# Patient Record
Sex: FEMALE | Race: WHITE | NOT HISPANIC OR LATINO | Employment: OTHER | URBAN - METROPOLITAN AREA
[De-identification: names, ages, dates, MRNs, and addresses within clinical notes are randomized per-mention and may not be internally consistent; named-entity substitution may affect disease eponyms.]

---

## 2021-03-24 ENCOUNTER — TRANSFERRED RECORDS (OUTPATIENT)
Dept: HEALTH INFORMATION MANAGEMENT | Facility: CLINIC | Age: 61
End: 2021-03-24
Payer: COMMERCIAL

## 2021-08-25 ENCOUNTER — LAB REQUISITION (OUTPATIENT)
Dept: LAB | Facility: CLINIC | Age: 61
End: 2021-08-25

## 2021-08-25 PROCEDURE — 86481 TB AG RESPONSE T-CELL SUSP: CPT | Performed by: INTERNAL MEDICINE

## 2021-08-26 LAB
GAMMA INTERFERON BACKGROUND BLD IA-ACNC: 0.03 IU/ML
M TB IFN-G BLD-IMP: NEGATIVE
M TB IFN-G CD4+ BCKGRND COR BLD-ACNC: 9.97 IU/ML
MITOGEN IGNF BCKGRD COR BLD-ACNC: 0.05 IU/ML
MITOGEN IGNF BCKGRD COR BLD-ACNC: 0.09 IU/ML
QUANTIFERON MITOGEN: 10 IU/ML
QUANTIFERON NIL TUBE: 0.03 IU/ML
QUANTIFERON TB1 TUBE: 0.08 IU/ML
QUANTIFERON TB2 TUBE: 0.12

## 2021-11-03 ENCOUNTER — TRANSCRIBE ORDERS (OUTPATIENT)
Dept: OTHER | Age: 61
End: 2021-11-03

## 2021-11-03 DIAGNOSIS — Z85.3 HX: BREAST CANCER: Primary | ICD-10-CM

## 2021-11-04 ENCOUNTER — PATIENT OUTREACH (OUTPATIENT)
Dept: ONCOLOGY | Facility: CLINIC | Age: 61
End: 2021-11-04

## 2021-11-05 NOTE — PROGRESS NOTES
New Patient Oncology Nurse Navigator Note     Referring provider: Self     Referring Clinic/Organization: patient is following Dr. De Souza from [facility]     Referred to (specialty): Medical Oncology     Requested provider (if applicable): Dr. Myles De Souza     Date Referral Received: November 4, 2021     Evaluation for:  Breast cancer     Clinical History (per Nurse review of records provided):      The patient presented with a diagnosis of atypical lobular hyperplasia of the left breast.  This was identified as architectural distortion and spiculated mass on screening mammogram.  She underwent left breast wire localized lumpectomy on 10/18/2017.     On final pathology, after undergoing the wire localized lumpectomy, she was found to have an invasive ductal carcinoma, grade 1, measuring 8 mm.  It was completely excised, with clear margins.   She opted to proceed with breast-conserving surgery.  She did have genetic testing and was negative. [place] lymph node biopsy was completed on 11/15/2017 with path finding negative sentinel lymph nodes.  Completed radiotherapy on 1/9/2018.    Initiated Tamoxifen April 2018.    Records Location (Care Everywhere, Media, etc.): Care Everywhere, Media, Bookmarked material    Records Needed: [facility], [facility]    She will be having a mammogram on 11/19 at [facility] in [place].      Telephoned and spoke briefly with Felipa who is traveling and requested call back in 10 minutes.  Writer did return call and left voice message requesting call back at her convenience.    We did speak again on 11/8 at 16:15 and she has not completed MYRNA with [facility]. She requested our medical records team facilitate that process.

## 2021-11-10 ENCOUNTER — PRE VISIT (OUTPATIENT)
Dept: OTHER | Age: 61
End: 2021-11-10

## 2021-11-10 NOTE — TELEPHONE ENCOUNTER
Action 11/10/2021@ 0933am   Action Taken Emailed MYRNA TO PATIENT FOR [facility]  CK        Burow's Graft Text: The defect edges were debeveled with a #15 scalpel blade.  Given the location of the defect, shape of the defect, the proximity to free margins and the presence of a standing cone deformity a Burow's skin graft was deemed most appropriate. The standing cone was removed and this tissue was then trimmed to the shape of the primary defect. The adipose tissue was also removed until only dermis and epidermis were left.  The skin margins of the secondary defect were undermined to an appropriate distance in all directions utilizing iris scissors.  The secondary defect was closed with interrupted buried subcutaneous sutures.  The skin edges were then re-apposed with running  sutures.  The skin graft was then placed in the primary defect and oriented appropriately.

## 2021-11-18 NOTE — TELEPHONE ENCOUNTER
RECORDS STATUS - BREAST    RECORDS REQUESTED FROM: Rosa Brock   DATE REQUESTED: 11/19/21   NOTES DETAILS STATUS   OFFICE NOTE from referring provider SELF    OFFICE NOTE from medical oncologist External- [facility] Dr. Myles De Souza   OFFICE NOTE from surgeon     OFFICE NOTE from radiation oncologist [facility] 01/09/18: Dr Carmen Choudhury   DISCHARGE SUMMARY from hospital     DISCHARGE REPORT from the ER     OPERATIVE REPORT [facility] 11/15/17: LEFT AXILLARY NODE SENTINEL LYMPH NODE BIOPSY  10/18/17: WIRE LOCALIZED LEFT BREAST LUMPECTOMY   MEDICATION LIST [facility]    CLINICAL TRIAL TREATMENTS TO DATE     LABS     REQUEST BLOCKS FOR ALL BREAST CANCER PTS     PATHOLOGY REPORTS  (Tissue diagnosis, Stage, ER/NV percentage positive and intensity of staining, HER2 IHC, FISH, and all biopsies from breast and any distant metastasis)                 Records in [facility] (not requested per protocol) 11/15/17: [id number]  10/18/17: [id number]  09/11/17: [id number]   GENONOMIC TESTING     TYPE:   (Next Generation Sequencing, including Foundation One testing, and Oncotype score)     IMAGING (NEED IMAGES & REPORT)     CT SCANS     XRAYS PACS 10/18/17   MRI PACS 10/27/17-09/20/13   MAMMO PACS 11/17/20-06/15/09   ULTRASOUND PACS 10/18/17-09/11/17   PET     BONE SCAN     BRAIN MRI       Action November 19, 2021 8:47 AM ABT   Action Taken Images from [facility] received and resolved to PACS. Called and spoke to Tiffanie at [facility], per [place] request a fax was sent to them at [phone] to resolve mammos, request sent    3:12 PM  Mammos resolved to PACS

## 2021-11-18 NOTE — TELEPHONE ENCOUNTER
Action 11/18/2021@1021am   Action Taken Records received from [facility] sent to urgent scanning  Ck

## 2021-12-01 NOTE — PROGRESS NOTES
[facility] Medical Oncology Note  December 2, 2021         Outpatient Progress Note      Assessment:     1. Stage IA ([id number]) well differentiated luminal a invasive ductal breast cancer.  This is a subcentimeter tumor with a risk of distant relapse at 10 years of really only 12% if we look at the [place] database of similar patients treated without systemic therapy.  2. Status post adjuvant radiotherapy.  3. If her chance of cure is in the high 80% range even without adjuvant systemic therapy, clearly the benefit of tamoxifen or an aromatase inhibitor is in the single digits.  It should really only be undertaken by somebody who was tolerates the side effects.  We have tried a lot with this very nice person and she had a good 3 years of adjuvant endocrine treatment.  I really do not feel strongly that she needs to remain on it for a total of 5 years.  4. Now, 4 years out from the time of diagnosis with no evidence of recurrent disease by history or physical exam.  5. 2 benign skin lesions on the chest wall.        Plan:     1. Return to clinic with me in 6 months for next exam  2. Once we get 5 years out from the time of diagnosis, we will go to as needed visits.          Myles De Souza MD, MSc  Associate Professor of Medicine  [employer] [place] [employer]  [facility]  [address] [place] [address]  [phone]    __________________________________________________________________    Diagnosis   1. Stage IA 8 mm well differentiated invasive ductal breast cancer, diagnosed definitively at lumpectomy 10/18/2017.  The tumor was 98% strongly positive for the estrogen receptor, 97% strongly positive for the progesterone receptor and negative for HER-2 by IHC with 0+ staining.  3 sentinel lymph nodes were negative for involvement.  Of note, there was LCIS in the specimen.        History of Present Illness/Therapy to Date:   1. Lumpectomy 10/18/2017  2. Adjuvant  radiotherapy completed 1/19/2018  3. Tamoxifen initiated 3/28/2018.  The dose was decreased to 10 mg January 2020.  It was held in January 2021 and finally just completely discontinued 3/24/2021.  She was not interested in an aromatase inhibitor.        Interval history:     Ashley is back    Her father's dementia is progressing.  She and her  are going to go down to [place] and spend time with her parents.  They even bought a place in [place] [employer].    She has a couple of skin lesions the chest wall that she wants me to look at.    She is otherwise feeling well.  She denies fevers, cough, new lumps or bumps.  There is no chest pain or shortness of breath.      Past Medical History:   I have reviewed this patient's past medical history   No past medical history on file.       Past Surgical History:    I have reviewed this patient's past surgical history       Social History:   Tobacco, ETOH, and rec drugs reviewed and as noted below with the following exceptions:  Ashley is  to Sy.  She is originally from [place] and went to [employer] and graduated in the class of 1976.  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  She then got her GENESIS at fairly [employer].  She was  and  and then met Sy who convinced her to move out to [place].  They have been together for the past 26 years and her one son together who finished college and was hoping to work in [employer] in [place].  She has 2 stepchildren both of whom live in the [facility].          Family History:     No family history on file.         Medications:     No current outpatient medications on file.              Physical Exam:   There were no vitals taken for this visit.    ECOG PS: 0  Constitutional: WDWN female in NAD, pleasant and appropriate  HEENT:  NC/AT, no icterus, OP clear,  MMM  Skin: No jaundice nor ecchymoses  Lungs: CTAB, no w/r/r, nonlabored breathing  Cardiovascular: RRR, S1, S2, no m/r/g  Abdomen: +BS, soft, nontender, nondistended, no organomegaly nor masses  MSK/Extremities: Warm, well perfused. No edema  LN: no cervical, supraclavicular, axillary, nor inguinal lymphadenopathy  Neurologic: alert, answering questions appropriately, moving all extremities spontaneously. CN 2-12 grossly intact.  Psych: appropriate affect  Breast exam: The left breast is status post lumpectomy and radiotherapy.  There is a well-healed upper outer quadrant incision.  There is no sign of local relapse.  There is minimal hyperpigmentation or skin thickening.  The right breast is without lesion and the right axilla is negative.  Data:   No results for input(s): WBC, NEUTROPHIL, HGB, PLT in the last 12533 hours.  No results for input(s): NA, POTASSIUM, CHLORIDE, CO2, ANIONGAP, BUN, CR, MANDY in the last 01466 hours.  No results for input(s): MAG, PHOS, LDH, URIC in the last 25693 hours.  No results for input(s): BILITOTAL, ALKPHOS, ALT, AST, ALBUMIN, LDH in the last 38457 hours.    No results found for this or any previous visit (from the past 24 hour(s)).    Other data           Labs, imaging and treatment plan reviewed with patient. All questions answered.        40 minutes spent on the date of the encounter doing chart review, review of outside records, review of test results, interpretation of tests, patient visit and documentation

## 2021-12-02 ENCOUNTER — ONCOLOGY VISIT (OUTPATIENT)
Dept: ONCOLOGY | Facility: CLINIC | Age: 61
End: 2021-12-02
Attending: INTERNAL MEDICINE
Payer: COMMERCIAL

## 2021-12-02 ENCOUNTER — PRE VISIT (OUTPATIENT)
Dept: ONCOLOGY | Facility: CLINIC | Age: 61
End: 2021-12-02
Payer: COMMERCIAL

## 2021-12-02 VITALS
DIASTOLIC BLOOD PRESSURE: 80 MMHG | HEART RATE: 59 BPM | WEIGHT: 168.2 LBS | OXYGEN SATURATION: 100 % | SYSTOLIC BLOOD PRESSURE: 121 MMHG

## 2021-12-02 DIAGNOSIS — C50.012 MALIGNANT NEOPLASM OF NIPPLE OF LEFT BREAST IN FEMALE, UNSPECIFIED ESTROGEN RECEPTOR STATUS (H): Primary | ICD-10-CM

## 2021-12-02 PROCEDURE — G0463 HOSPITAL OUTPT CLINIC VISIT: HCPCS

## 2021-12-02 PROCEDURE — 99215 OFFICE O/P EST HI 40 MIN: CPT | Performed by: INTERNAL MEDICINE

## 2021-12-02 ASSESSMENT — PAIN SCALES - GENERAL: PAINLEVEL: MILD PAIN (2)

## 2021-12-02 NOTE — LETTER
Date:December 29, 2021      Patient was self referred, no letter generated. Do not send.        [facility] Health Information

## 2021-12-02 NOTE — NURSING NOTE
Oncology Rooming Note    December 2, 2021 3:13 PM   Felipa Myers is a 61 year old female who presents for:    Chief Complaint   Patient presents with     Oncology Clinic Visit     breast cancer     Initial Vitals: /80   Pulse 59   Wt 76.3 kg (168 lb 3.2 oz)   SpO2 100%  There is no height or weight on file to calculate BMI. There is no height or weight on file to calculate BSA.  Mild Pain (2) Comment: Data Unavailable   No LMP recorded. Patient is postmenopausal.  Allergies reviewed: Yes  Medications reviewed: Yes    Medications: Medication refills not needed today.  Pharmacy name entered into [facility] [employer]: "[id number] - [place], [place] - [address] AT [facility]    Clinical concerns: none       Sepideh Hager CMA

## 2022-06-08 NOTE — PROGRESS NOTES
[facility] Medical Oncology Note  Raeann 10, 2022         Outpatient Progress Note      Assessment:     1. Stage IA ([id number]) well differentiated luminal a invasive ductal breast cancer.  This is a subcentimeter tumor with a risk of distant relapse at 10 years of really only 12% if we look at the [place] database of similar patients treated without systemic therapy.  2. Status post adjuvant radiotherapy.  3. If her chance of cure is in the high 80% range even without adjuvant systemic therapy, clearly the benefit of tamoxifen or an aromatase inhibitor is in the single digits.  It should really only be undertaken by somebody who was tolerates the side effects.  We have tried a lot with this very nice person and she had a good 3 years of adjuvant endocrine treatment.  I really do not feel strongly that she needs to remain on it for a total of 5 years.  4. Now, approaching 5 years out from the time of diagnosis with no evidence of recurrent disease by history or physical exam.  5. The truth is at this point we can start going to as needed visits.  She is not on any adjuvant systemic therapy.  She has an enormously high chance of cure at this point.  Ashley was delighted to hear this.      Plan:     1. Continue yearly bilateral mammography  2. Return to clinic with me on an as-needed basis       what a delight it has been to take care of this terrific person!        Myles De Souza MD, MSc  Associate Professor of Medicine  [employer] [place] [employer]  [facility]  [address] [place] [id number]  [phone]    __________________________________________________________________    Diagnosis   1. Stage IA 8 mm well differentiated invasive ductal breast cancer, diagnosed definitively at lumpectomy 10/18/2017.  The tumor was 98% strongly positive for the estrogen receptor, 97% strongly positive for the progesterone receptor and negative for HER-2 by IHC with 0+ staining.  3  sentinel lymph nodes were negative for involvement.  Of note, there was LCIS in the specimen.    History of Present Illness/Therapy to Date:   1. Lumpectomy 10/18/2017  2. Adjuvant radiotherapy completed 1/19/2018  3. Tamoxifen initiated 3/28/2018.  The dose was decreased to 10 mg January 2020.  It was held in January 2021 and finally just completely discontinued 3/24/2021.  She was not interested in an aromatase inhibitor.        Interval history:     Ashley is back    She recently suffered a tibia plateau fracture while in [place] this winter to help her parents, who are not ambulatory.  She ended up in a wheelchair.  Her  is in a knee brace because he is awaiting a knee replacement.  And to top it off, heir dog needed a cone of shame because he had pink eye. They were quite a combo.    She has recovered well and fells fine.    She has no new lumps or bumps    She is hoping this can be her last appointment.    Her father's dementia is progressing.      She has no other new concerns today.      Past Medical History:   I have reviewed this patient's past medical history   No past medical history on file.       Past Surgical History:    I have reviewed this patient's past surgical history       Social History:   Tobacco, ETOH, and rec drugs reviewed and as noted below with the following exceptions:  Ashley is  to Sy.  She is originally from [place] and went to [employer] high [employer] and graduated in the class of 1976.  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  She then got her GENESIS at [employer].  She was  and  and then met Sy who convinced her to move out to [place].  They have been together for the past 26 years and her one son together who finished college and was hoping to work in advertising in [place].  She has 2  stepchildren both of whom live in the [facility].          Family History:     No family history on file.         Medications:     No current outpatient medications on file.              Physical Exam:   There were no vitals taken for this visit.    ECOG PS: 0  Constitutional: WDWN female in NAD, pleasant and appropriate  HEENT:  NC/AT, no icterus, OP clear, MMM  Skin: No jaundice nor ecchymoses  Lungs: CTAB, no w/r/r, nonlabored breathing  Cardiovascular: RRR, S1, S2, no m/r/g  Abdomen: +BS, soft, nontender, nondistended, no organomegaly nor masses  MSK/Extremities: Warm, well perfused. No edema  LN: no cervical, supraclavicular, axillary, nor inguinal lymphadenopathy  Neurologic: alert, answering questions appropriately, moving all extremities spontaneously. CN 2-12 grossly intact.  Psych: appropriate affect  Breast exam: The left breast is status post lumpectomy and radiotherapy.  There is a well-healed upper outer quadrant incision.  There is no sign of local relapse.  There is minimal hyperpigmentation or skin thickening.  The right breast is without lesion and the right axilla is negative.  Data:   No results for input(s): WBC, NEUTROPHIL, HGB, PLT in the last 13348 hours.  No results for input(s): NA, POTASSIUM, CHLORIDE, CO2, ANIONGAP, BUN, CR, MANDY in the last 55735 hours.  No results for input(s): MAG, PHOS, LDH, URIC in the last 43406 hours.  No results for input(s): BILITOTAL, ALKPHOS, ALT, AST, ALBUMIN, LDH in the last 01959 hours.    No results found for this or any previous visit (from the past 24 hour(s)).    Other data           Labs, imaging and treatment plan reviewed with patient. All questions answered.        30 minutes spent on the date of the encounter doing chart review, review of outside records, review of test results, interpretation of tests, patient visit and documentation

## 2022-06-10 ENCOUNTER — ONCOLOGY VISIT (OUTPATIENT)
Dept: ONCOLOGY | Facility: CLINIC | Age: 62
End: 2022-06-10
Attending: INTERNAL MEDICINE
Payer: COMMERCIAL

## 2022-06-10 VITALS
RESPIRATION RATE: 18 BRPM | WEIGHT: 170.3 LBS | SYSTOLIC BLOOD PRESSURE: 135 MMHG | HEART RATE: 60 BPM | TEMPERATURE: 98 F | OXYGEN SATURATION: 100 % | DIASTOLIC BLOOD PRESSURE: 83 MMHG

## 2022-06-10 DIAGNOSIS — C50.012 MALIGNANT NEOPLASM OF NIPPLE OF LEFT BREAST IN FEMALE, UNSPECIFIED ESTROGEN RECEPTOR STATUS (H): Primary | ICD-10-CM

## 2022-06-10 PROCEDURE — G0463 HOSPITAL OUTPT CLINIC VISIT: HCPCS

## 2022-06-10 PROCEDURE — 99214 OFFICE O/P EST MOD 30 MIN: CPT | Performed by: INTERNAL MEDICINE

## 2022-06-10 RX ORDER — ALPRAZOLAM 0.25 MG
TABLET ORAL
COMMUNITY
Start: 2022-06-07

## 2022-06-10 RX ORDER — TRAMADOL HYDROCHLORIDE 50 MG/1
TABLET ORAL
COMMUNITY
Start: 2022-04-26

## 2022-06-10 ASSESSMENT — PAIN SCALES - GENERAL: PAINLEVEL: NO PAIN (0)

## 2022-06-10 NOTE — NURSING NOTE
Oncology Rooming Note    Raeann 10, 2022 11:05 AM   Felipa Myers is a 62 year old female who presents for:    Chief Complaint   Patient presents with     Oncology Clinic Visit     Breast cancer       Initial Vitals: /83 (BP Location: Right arm, Patient Position: Sitting, Cuff Size: Adult Regular)   Pulse 60   Temp 98  F (36.7  C) (Oral)   Resp 18   Wt 77.2 kg (170 lb 4.8 oz)   SpO2 100%  There is no height or weight on file to calculate BMI. There is no height or weight on file to calculate BSA.  No Pain (0) Comment: Data Unavailable   No LMP recorded. Patient is postmenopausal.  Allergies reviewed: Yes  Medications reviewed: Yes    Medications: Medication refills not needed today.  Pharmacy name entered into [facility]: [id number] - [place] [address] AT [facility]    Clinical concerns: No major changes reported. Patient states that she broke her left leg late last year-some left knee discomfort intermittently.        Helen Pittman LPN Raeann 10, 2022 11:05 AM

## 2022-06-10 NOTE — LETTER
6/10/2022         RE: Felip[facility] Myers  [address] [facility] [address]        Dear Colleague,    Thank you for referring your patient, Felipa Myers, to the [facility]. Please see a copy of my visit note below.      [facility] Medical Oncology Note  Raeann 10, 2022         Outpatient Progress Note      Assessment:     1. Stage IA ([id number]) well differentiated luminal a invasive ductal breast cancer.  This is a subcentimeter tumor with a risk of distant relapse at 10 years of really only 12% if we look at the [place] database of similar patients treated without systemic therapy.  2. Status post adjuvant radiotherapy.  3. If her chance of cure is in the high 80% range even without adjuvant systemic therapy, clearly the benefit of tamoxifen or an aromatase inhibitor is in the single digits.  It should really only be undertaken by somebody who was tolerates the side effects.  We have tried a lot with this very nice person and she had a good 3 years of adjuvant endocrine treatment.  I really do not feel strongly that she needs to remain on it for a total of 5 years.  4. Now, approaching 5 years out from the time of diagnosis with no evidence of recurrent disease by history or physical exam.  5. The truth is at this point we can start going to as needed visits.  She is not on any adjuvant systemic therapy.  She has an enormously high chance of cure at this point.  Ashley was delighted to hear this.      Plan:     1. Continue yearly bilateral mammography  2. Return to clinic with me on an as-needed basis       what a delight it has been to take care of this terrific person!        Myles De Souza MD, MSc  Associate Professor of Medicine  [employer]  [facility]  [address] [place] [id number]  [phone]    __________________________________________________________________    Diagnosis   1. Stage IA 8 mm  well differentiated invasive ductal breast cancer, diagnosed definitively at lumpectomy 10/18/2017.  The tumor was 98% strongly positive for the estrogen receptor, 97% strongly positive for the progesterone receptor and negative for HER-2 by IHC with 0+ staining.  3 sentinel lymph nodes were negative for involvement.  Of note, there was LCIS in the specimen.    History of Present Illness/Therapy to Date:   1. Lumpectomy 10/18/2017  2. Adjuvant radiotherapy completed 1/19/2018  3. Tamoxifen initiated 3/28/2018.  The dose was decreased to 10 mg January 2020.  It was held in January 2021 and finally just completely discontinued 3/24/2021.  She was not interested in an aromatase inhibitor.        Interval history:     Ashley is back    She recently suffered a tibia plateau fracture while in [place] this winter to help her parents, who are not ambulatory.  She ended up in a wheelchair.  Her  is in a knee brace because he is awaiting a knee replacement.  And to top it off, heparker dog needed a cone of shame because he had pink eye. They were quite a combo.    She has recovered well and fells fine.    She has no new lumps or bumps    She is hoping this can be her last appointment.    Her father's dementia is progressing.      She has no other new concerns today.      Past Medical History:   I have reviewed this patient's past medical history   No past medical history on file.       Past Surgical History:    I have reviewed this patient's past surgical history       Social History:   Tobacco, ETOH, and rec drugs reviewed and as noted below with the following exceptions:  Ashley is  to Sy.  She is originally from [place] and went to [employer] and graduated in the class of 1976.  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  She then got her [employer] at [employer].  She was  and  and then met Sy who convinced her to move out to [place].  They have been together for the past 26 years and her one son together who finished college and was hoping to work in [employer] in [place].  She has 2 stepchildren both of whom live in the [facility].          Family History:     No family history on file.         Medications:     No current outpatient medications on file.              Physical Exam:   There were no vitals taken for this visit.    ECOG PS: 0  Constitutional: WDWN female in NAD, pleasant and appropriate  HEENT:  NC/AT, no icterus, OP clear, MMM  Skin: No jaundice nor ecchymoses  Lungs: CTAB, no w/r/r, nonlabored breathing  Cardiovascular: RRR, S1, S2, no m/r/g  Abdomen: +BS, soft, nontender, nondistended, no organomegaly nor masses  MSK/Extremities: Warm, well perfused. No edema  LN: no cervical, supraclavicular, axillary, nor inguinal lymphadenopathy  Neurologic: alert, answering questions appropriately, moving all extremities spontaneously. CN 2-12 grossly intact.  Psych: appropriate affect  Breast exam: The left breast is status post lumpectomy and radiotherapy.  There is a well-healed upper outer quadrant incision.  There is no sign of local relapse.  There is minimal hyperpigmentation or skin thickening.  The right breast is without lesion and the right axilla is negative.  Data:   No results for input(s): WBC, NEUTROPHIL, HGB, PLT in the last 45426 hours.  No results for input(s): NA, POTASSIUM, CHLORIDE, CO2, ANIONGAP, BUN, CR, MANDY in the last 82412 hours.  No results for input(s): MAG, PHOS, LDH, URIC in the last 73868 hours.  No results for input(s): BILITOTAL, ALKPHOS, ALT, AST, ALBUMIN, LDH in the last 39614 hours.    No results found for this or any previous visit (from the past 24 hour(s)).    Other data       Labs, imaging and treatment plan reviewed with patient. All questions answered.      30 minutes spent on the date of the  encounter doing chart review, review of outside records, review of test results, interpretation of tests, patient visit and documentation           Again, thank you for allowing me to participate in the care of your patient.      Sincerely,    Myles De Souza MD

## 2022-07-24 ENCOUNTER — HEALTH MAINTENANCE LETTER (OUTPATIENT)
Age: 62
End: 2022-07-24

## 2022-08-01 ENCOUNTER — TELEPHONE (OUTPATIENT)
Dept: ONCOLOGY | Facility: CLINIC | Age: 62
End: 2022-08-01

## 2022-08-01 NOTE — TELEPHONE ENCOUNTER
Oncology Nurse Triage - Pain    Situation: Felipa (pt) reporting the following symptoms: Pain    Background:   Treating Provider:   Dr. De Souza     Date of last office visit: 6/10/22.     Assessment:     Location: Left under arm  Onset: pt states was carrying heavy objects about 4-5 days ago and pulled up on some of the objects and since then feels increase tenderness under arm.   Duration/Frequency:intermittent  Rates: 1/10  Quality: If palpated, some mild tenderness/like bruising/dull pain.   Current med(s) used:Pt tried Tylenol which did seem to help.   Worsens or relieves with: has some friction when arm moving with walking.   Skin in tact no new lumps or bumps.     Denies fevers/chills, cough, sore throat, chest pain, SOB, headache.      Recommendations:   This writer educated on avoid heavy lifting for next couple of weeks, try ice/heat therapy for 20minutes on an off. Gentle range of motion/stretching to arm.  Continue to self monitor. Follow up with PCP or UC if wants to be evaluated today or tomorrow.    Will send update to care team if they have any other concerns.

## 2022-10-03 ENCOUNTER — HEALTH MAINTENANCE LETTER (OUTPATIENT)
Age: 62
End: 2022-10-03

## 2023-08-13 ENCOUNTER — HEALTH MAINTENANCE LETTER (OUTPATIENT)
Age: 63
End: 2023-08-13

## 2024-03-09 ENCOUNTER — HEALTH MAINTENANCE LETTER (OUTPATIENT)
Age: 64
End: 2024-03-09

## 2024-08-12 ENCOUNTER — PATIENT OUTREACH (OUTPATIENT)
Dept: ONCOLOGY | Facility: CLINIC | Age: 64
End: 2024-08-12
Payer: COMMERCIAL

## 2024-08-12 NOTE — PROGRESS NOTES
[facility]: Cancer Care                                                                                         Completed chart audit to assign Oncology Care Coordination enrollment status.    Rahel MATTHEWSN, RN, OCN  Care Coordinator  [facility]

## 2024-10-05 ENCOUNTER — HEALTH MAINTENANCE LETTER (OUTPATIENT)
Age: 64
End: 2024-10-05